# Patient Record
Sex: FEMALE | Race: WHITE
[De-identification: names, ages, dates, MRNs, and addresses within clinical notes are randomized per-mention and may not be internally consistent; named-entity substitution may affect disease eponyms.]

---

## 2018-06-22 ENCOUNTER — HOSPITAL ENCOUNTER (EMERGENCY)
Dept: HOSPITAL 95 - ER | Age: 63
Discharge: HOME | End: 2018-06-22
Payer: COMMERCIAL

## 2018-06-22 VITALS — BODY MASS INDEX: 38.66 KG/M2 | HEIGHT: 69 IN | WEIGHT: 261.01 LBS

## 2018-06-22 DIAGNOSIS — Z79.52: ICD-10-CM

## 2018-06-22 DIAGNOSIS — Z79.899: ICD-10-CM

## 2018-06-22 DIAGNOSIS — Z88.6: ICD-10-CM

## 2018-06-22 DIAGNOSIS — R51: Primary | ICD-10-CM

## 2018-06-22 DIAGNOSIS — E11.9: ICD-10-CM

## 2018-06-22 DIAGNOSIS — Z88.0: ICD-10-CM

## 2018-06-22 DIAGNOSIS — F17.200: ICD-10-CM

## 2018-06-22 DIAGNOSIS — Z79.84: ICD-10-CM

## 2018-06-22 DIAGNOSIS — E78.00: ICD-10-CM

## 2018-07-10 ENCOUNTER — HOSPITAL ENCOUNTER (OUTPATIENT)
Dept: HOSPITAL 95 - LAB SHORT | Age: 63
Discharge: HOME | End: 2018-07-10
Attending: PHYSICIAN ASSISTANT
Payer: COMMERCIAL

## 2018-07-10 DIAGNOSIS — Z51.81: Primary | ICD-10-CM

## 2018-07-10 DIAGNOSIS — Z79.899: ICD-10-CM

## 2018-07-10 PROCEDURE — G0480 DRUG TEST DEF 1-7 CLASSES: HCPCS

## 2018-12-21 ENCOUNTER — HOSPITAL ENCOUNTER (OUTPATIENT)
Dept: HOSPITAL 95 - ORSCSDS | Age: 63
Discharge: HOME | End: 2018-12-21
Attending: INTERNAL MEDICINE
Payer: COMMERCIAL

## 2018-12-21 VITALS — BODY MASS INDEX: 50.73 KG/M2 | HEIGHT: 60 IN | WEIGHT: 258.38 LBS

## 2018-12-21 DIAGNOSIS — R19.4: Primary | ICD-10-CM

## 2018-12-21 DIAGNOSIS — E66.9: ICD-10-CM

## 2018-12-21 DIAGNOSIS — Z80.0: ICD-10-CM

## 2018-12-21 DIAGNOSIS — F17.210: ICD-10-CM

## 2018-12-21 DIAGNOSIS — K64.8: ICD-10-CM

## 2018-12-21 DIAGNOSIS — G47.33: ICD-10-CM

## 2018-12-21 DIAGNOSIS — J44.9: ICD-10-CM

## 2018-12-21 DIAGNOSIS — Z79.84: ICD-10-CM

## 2018-12-21 DIAGNOSIS — E11.9: ICD-10-CM

## 2018-12-21 DIAGNOSIS — D12.0: ICD-10-CM

## 2018-12-21 DIAGNOSIS — E78.5: ICD-10-CM

## 2018-12-21 DIAGNOSIS — E03.9: ICD-10-CM

## 2018-12-21 DIAGNOSIS — Z86.010: ICD-10-CM

## 2018-12-21 DIAGNOSIS — K92.1: ICD-10-CM

## 2018-12-21 DIAGNOSIS — Z79.899: ICD-10-CM

## 2018-12-21 DIAGNOSIS — K31.7: ICD-10-CM

## 2018-12-21 DIAGNOSIS — F41.8: ICD-10-CM

## 2018-12-21 DIAGNOSIS — I10: ICD-10-CM

## 2018-12-21 PROCEDURE — 0DBN8ZX EXCISION OF SIGMOID COLON, VIA NATURAL OR ARTIFICIAL OPENING ENDOSCOPIC, DIAGNOSTIC: ICD-10-PCS | Performed by: INTERNAL MEDICINE

## 2018-12-21 PROCEDURE — 0DBE8ZX EXCISION OF LARGE INTESTINE, VIA NATURAL OR ARTIFICIAL OPENING ENDOSCOPIC, DIAGNOSTIC: ICD-10-PCS | Performed by: INTERNAL MEDICINE

## 2018-12-21 PROCEDURE — 0DB98ZX EXCISION OF DUODENUM, VIA NATURAL OR ARTIFICIAL OPENING ENDOSCOPIC, DIAGNOSTIC: ICD-10-PCS | Performed by: INTERNAL MEDICINE

## 2018-12-21 PROCEDURE — 0DB88ZX EXCISION OF SMALL INTESTINE, VIA NATURAL OR ARTIFICIAL OPENING ENDOSCOPIC, DIAGNOSTIC: ICD-10-PCS | Performed by: INTERNAL MEDICINE

## 2019-09-03 ENCOUNTER — HOSPITAL ENCOUNTER (EMERGENCY)
Dept: HOSPITAL 95 - ER | Age: 64
Discharge: HOME | End: 2019-09-03
Payer: COMMERCIAL

## 2019-09-03 VITALS — BODY MASS INDEX: 45.24 KG/M2 | WEIGHT: 265 LBS | HEIGHT: 64 IN

## 2019-09-03 DIAGNOSIS — Z88.0: ICD-10-CM
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
## 2019-09-03 DIAGNOSIS — Z88.8: ICD-10-CM

## 2019-09-03 DIAGNOSIS — Z79.52: ICD-10-CM

## 2019-09-03 DIAGNOSIS — Z79.899: ICD-10-CM

## 2019-09-03 DIAGNOSIS — F17.210: ICD-10-CM

## 2019-09-03 DIAGNOSIS — E11.9: ICD-10-CM

## 2019-09-03 DIAGNOSIS — Z79.84: ICD-10-CM

## 2019-09-03 DIAGNOSIS — S50.01XA: Primary | ICD-10-CM

## 2020-09-15 ENCOUNTER — HOSPITAL ENCOUNTER (OUTPATIENT)
Dept: HOSPITAL 95 - LAB SHORT | Age: 65
End: 2020-09-15
Attending: NURSE PRACTITIONER
Payer: COMMERCIAL

## 2020-09-15 DIAGNOSIS — Z12.4: Primary | ICD-10-CM

## 2020-09-15 PROCEDURE — G0123 SCREEN CERV/VAG THIN LAYER: HCPCS

## 2020-09-17 LAB — OTHER STN SPEC: (no result)

## 2020-10-28 ENCOUNTER — HOSPITAL ENCOUNTER (OUTPATIENT)
Dept: HOSPITAL 95 - ORSCSDS | Age: 65
Discharge: HOME | End: 2020-10-28
Attending: ANESTHESIOLOGY
Payer: COMMERCIAL

## 2020-10-28 VITALS — WEIGHT: 288.59 LBS | HEIGHT: 64.02 IN | BODY MASS INDEX: 49.27 KG/M2

## 2020-10-28 DIAGNOSIS — G47.33: ICD-10-CM

## 2020-10-28 DIAGNOSIS — F41.9: ICD-10-CM

## 2020-10-28 DIAGNOSIS — Z79.899: ICD-10-CM

## 2020-10-28 DIAGNOSIS — E11.9: ICD-10-CM

## 2020-10-28 DIAGNOSIS — F17.210: ICD-10-CM

## 2020-10-28 DIAGNOSIS — M50.122: Primary | ICD-10-CM

## 2020-10-28 DIAGNOSIS — E66.01: ICD-10-CM

## 2020-10-28 DIAGNOSIS — J44.9: ICD-10-CM

## 2020-10-28 DIAGNOSIS — I10: ICD-10-CM

## 2020-10-28 DIAGNOSIS — Z79.84: ICD-10-CM

## 2020-10-28 DIAGNOSIS — E78.00: ICD-10-CM

## 2020-10-28 PROCEDURE — 3E0R33Z INTRODUCTION OF ANTI-INFLAMMATORY INTO SPINAL CANAL, PERCUTANEOUS APPROACH: ICD-10-PCS | Performed by: ANESTHESIOLOGY

## 2020-10-28 NOTE — NUR
10/28/20 [address]. NOTIFIED THAT PATIENTS BP IS 87/51.  INSTRUCTIONS GIVEN TO
GIVE PATIENT THE REST OF HER IV FLUIDS, WHICH IS ABOUT 100ML.  WILL
CONTINUE TO MONITOR BP.  IF PATIENT FEELS OK, SHE CAN BE DISCHARGED
HOME.

## 2021-01-18 ENCOUNTER — HOSPITAL ENCOUNTER (EMERGENCY)
Dept: HOSPITAL 95 - ER | Age: 66
Discharge: HOME | End: 2021-01-18
Payer: COMMERCIAL

## 2021-01-18 VITALS — WEIGHT: 284.99 LBS | BODY MASS INDEX: 48.65 KG/M2 | HEIGHT: 64 IN

## 2021-01-18 DIAGNOSIS — Z88.6: ICD-10-CM

## 2021-01-18 DIAGNOSIS — F17.210: ICD-10-CM

## 2021-01-18 DIAGNOSIS — E78.00: ICD-10-CM

## 2021-01-18 DIAGNOSIS — Z88.0: ICD-10-CM

## 2021-01-18 DIAGNOSIS — G89.18: Primary | ICD-10-CM

## 2021-01-18 DIAGNOSIS — E11.9: ICD-10-CM

## 2021-01-18 DIAGNOSIS — Z79.899: ICD-10-CM

## 2021-01-18 LAB
ALBUMIN SERPL BCP-MCNC: 2.9 G/DL (ref 3.4–5)
ALBUMIN/GLOB SERPL: 0.6 {RATIO} (ref 0.8–1.8)
ALT SERPL W P-5'-P-CCNC: 15 U/L (ref 12–78)
ANION GAP SERPL CALCULATED.4IONS-SCNC: 8 MMOL/L (ref 6–16)
AST SERPL W P-5'-P-CCNC: 20 U/L (ref 12–37)
BASOPHILS # BLD AUTO: 0.04 K/MM3 (ref 0–0.23)
BASOPHILS NFR BLD AUTO: 1 % (ref 0–2)
BILIRUB SERPL-MCNC: 0.5 MG/DL (ref 0.1–1)
BUN SERPL-MCNC: 7 MG/DL (ref 8–24)
CALCIUM SERPL-MCNC: 8.8 MG/DL (ref 8.5–10.1)
CHLORIDE SERPL-SCNC: 103 MMOL/L (ref 98–108)
CO2 SERPL-SCNC: 24 MMOL/L (ref 21–32)
CREAT SERPL-MCNC: 0.65 MG/DL (ref 0.4–1)
DEPRECATED RDW RBC AUTO: 46.9 FL (ref 35.1–46.3)
EOSINOPHIL # BLD AUTO: 0.18 K/MM3 (ref 0–0.68)
EOSINOPHIL NFR BLD AUTO: 3 % (ref 0–6)
ERYTHROCYTE [DISTWIDTH] IN BLOOD BY AUTOMATED COUNT: 13.6 % (ref 11.7–14.2)
GLOBULIN SER CALC-MCNC: 4.6 G/DL (ref 2.2–4)
GLUCOSE SERPL-MCNC: 129 MG/DL (ref 70–99)
HCT VFR BLD AUTO: 42.3 % (ref 33–51)
HGB BLD-MCNC: 13.6 G/DL (ref 11.5–16)
IMM GRANULOCYTES # BLD AUTO: 0.05 K/MM3 (ref 0–0.1)
IMM GRANULOCYTES NFR BLD AUTO: 1 % (ref 0–1)
LYMPHOCYTES # BLD AUTO: 1.41 K/MM3 (ref 0.84–5.2)
LYMPHOCYTES NFR BLD AUTO: 23 % (ref 21–46)
MCHC RBC AUTO-ENTMCNC: 32.2 G/DL (ref 31.5–36.5)
MCV RBC AUTO: 93 FL (ref 80–100)
MONOCYTES # BLD AUTO: 0.54 K/MM3 (ref 0.16–1.47)
MONOCYTES NFR BLD AUTO: 9 % (ref 4–13)
NEUTROPHILS # BLD AUTO: 3.89 K/MM3 (ref 1.96–9.15)
NEUTROPHILS NFR BLD AUTO: 64 % (ref 41–73)
NRBC # BLD AUTO: 0 K/MM3 (ref 0–0.02)
NRBC BLD AUTO-RTO: 0 /100 WBC (ref 0–0.2)
PLATELET # BLD AUTO: 242 K/MM3 (ref 150–400)
POTASSIUM SERPL-SCNC: 4 MMOL/L (ref 3.5–5.5)
PROT SERPL-MCNC: 7.5 G/DL (ref 6.4–8.2)
SODIUM SERPL-SCNC: 135 MMOL/L (ref 136–145)

## 2022-01-07 ENCOUNTER — HOSPITAL ENCOUNTER (OUTPATIENT)
Dept: HOSPITAL 95 - ORSCSDS | Age: 67
Discharge: HOME | End: 2022-01-07
Attending: INTERNAL MEDICINE
Payer: COMMERCIAL

## 2022-01-07 VITALS — BODY MASS INDEX: 50.02 KG/M2 | HEIGHT: 64 IN | WEIGHT: 293 LBS

## 2022-01-07 DIAGNOSIS — I12.9: ICD-10-CM

## 2022-01-07 DIAGNOSIS — N18.9: ICD-10-CM

## 2022-01-07 DIAGNOSIS — Z86.010: ICD-10-CM

## 2022-01-07 DIAGNOSIS — F41.8: ICD-10-CM

## 2022-01-07 DIAGNOSIS — G47.33: ICD-10-CM

## 2022-01-07 DIAGNOSIS — Z87.891: ICD-10-CM

## 2022-01-07 DIAGNOSIS — E11.9: ICD-10-CM

## 2022-01-07 DIAGNOSIS — Z87.19: ICD-10-CM

## 2022-01-07 DIAGNOSIS — Z12.11: Primary | ICD-10-CM

## 2022-01-07 DIAGNOSIS — D12.3: ICD-10-CM

## 2022-01-07 DIAGNOSIS — K57.30: ICD-10-CM

## 2022-01-07 DIAGNOSIS — Z80.0: ICD-10-CM

## 2022-01-07 DIAGNOSIS — K64.8: ICD-10-CM

## 2022-01-07 DIAGNOSIS — J44.9: ICD-10-CM

## 2022-01-07 DIAGNOSIS — K21.9: ICD-10-CM

## 2022-01-07 DIAGNOSIS — E66.01: ICD-10-CM

## 2022-01-07 PROCEDURE — 0DBE8ZX EXCISION OF LARGE INTESTINE, VIA NATURAL OR ARTIFICIAL OPENING ENDOSCOPIC, DIAGNOSTIC: ICD-10-PCS | Performed by: INTERNAL MEDICINE

## 2022-01-07 PROCEDURE — 0DBL8ZX EXCISION OF TRANSVERSE COLON, VIA NATURAL OR ARTIFICIAL OPENING ENDOSCOPIC, DIAGNOSTIC: ICD-10-PCS | Performed by: INTERNAL MEDICINE

## 2022-05-12 ENCOUNTER — HOSPITAL ENCOUNTER (EMERGENCY)
Dept: HOSPITAL 95 - ER | Age: 67
Discharge: HOME | End: 2022-05-12
Payer: COMMERCIAL

## 2022-05-12 VITALS — BODY MASS INDEX: 50.02 KG/M2 | WEIGHT: 293 LBS | HEIGHT: 64 IN

## 2022-05-12 DIAGNOSIS — Z99.81: ICD-10-CM

## 2022-05-12 DIAGNOSIS — Z87.891: ICD-10-CM

## 2022-05-12 DIAGNOSIS — J44.9: ICD-10-CM

## 2022-05-12 DIAGNOSIS — R07.89: Primary | ICD-10-CM

## 2022-05-12 DIAGNOSIS — E11.9: ICD-10-CM

## 2022-05-12 LAB
ALBUMIN SERPL BCP-MCNC: 3.2 G/DL (ref 3.4–5)
ALBUMIN/GLOB SERPL: 0.8 {RATIO} (ref 0.8–1.8)
ALT SERPL W P-5'-P-CCNC: 46 U/L (ref 12–78)
ANION GAP SERPL CALCULATED.4IONS-SCNC: 3 MMOL/L (ref 6–16)
AST SERPL W P-5'-P-CCNC: 47 U/L (ref 12–37)
BASOPHILS # BLD AUTO: 0.03 K/MM3 (ref 0–0.23)
BASOPHILS NFR BLD AUTO: 1 % (ref 0–2)
BILIRUB SERPL-MCNC: 0.4 MG/DL (ref 0.1–1)
BUN SERPL-MCNC: 15 MG/DL (ref 8–24)
CALCIUM SERPL-MCNC: 9.2 MG/DL (ref 8.5–10.1)
CHLORIDE SERPL-SCNC: 102 MMOL/L (ref 98–108)
CO2 SERPL-SCNC: 33 MMOL/L (ref 21–32)
CREAT SERPL-MCNC: 0.76 MG/DL (ref 0.4–1)
DEPRECATED RDW RBC AUTO: 48 FL (ref 35.1–46.3)
EOSINOPHIL # BLD AUTO: 0.12 K/MM3 (ref 0–0.68)
EOSINOPHIL NFR BLD AUTO: 3 % (ref 0–6)
ERYTHROCYTE [DISTWIDTH] IN BLOOD BY AUTOMATED COUNT: 13.4 % (ref 11.7–14.2)
GLOBULIN SER CALC-MCNC: 3.8 G/DL (ref 2.2–4)
GLUCOSE SERPL-MCNC: 156 MG/DL (ref 70–99)
HCT VFR BLD AUTO: 42.6 % (ref 33–51)
HGB BLD-MCNC: 13.3 G/DL (ref 11.5–16)
IMM GRANULOCYTES # BLD AUTO: 0.02 K/MM3 (ref 0–0.1)
IMM GRANULOCYTES NFR BLD AUTO: 1 % (ref 0–1)
LYMPHOCYTES # BLD AUTO: 0.87 K/MM3 (ref 0.84–5.2)
LYMPHOCYTES NFR BLD AUTO: 20 % (ref 21–46)
MCHC RBC AUTO-ENTMCNC: 31.2 G/DL (ref 31.5–36.5)
MCV RBC AUTO: 96 FL (ref 80–100)
MONOCYTES # BLD AUTO: 0.36 K/MM3 (ref 0.16–1.47)
MONOCYTES NFR BLD AUTO: 8 % (ref 4–13)
NEUTROPHILS # BLD AUTO: 2.9 K/MM3 (ref 1.96–9.15)
NEUTROPHILS NFR BLD AUTO: 67 % (ref 41–73)
NRBC # BLD AUTO: 0 K/MM3 (ref 0–0.02)
NRBC BLD AUTO-RTO: 0 /100 WBC (ref 0–0.2)
PLATELET # BLD AUTO: 193 K/MM3 (ref 150–400)
POTASSIUM SERPL-SCNC: 4.2 MMOL/L (ref 3.5–5.5)
PROT SERPL-MCNC: 7 G/DL (ref 6.4–8.2)
SODIUM SERPL-SCNC: 138 MMOL/L (ref 136–145)

## 2022-07-13 ENCOUNTER — HOSPITAL ENCOUNTER (EMERGENCY)
Dept: HOSPITAL 95 - ER | Age: 67
Discharge: HOME | End: 2022-07-13
Payer: COMMERCIAL

## 2022-07-13 VITALS — HEIGHT: 64 IN | BODY MASS INDEX: 50.02 KG/M2 | WEIGHT: 293 LBS

## 2022-07-13 DIAGNOSIS — Z79.899: ICD-10-CM

## 2022-07-13 DIAGNOSIS — Z88.6: ICD-10-CM

## 2022-07-13 DIAGNOSIS — Z79.84: ICD-10-CM

## 2022-07-13 DIAGNOSIS — B37.0: ICD-10-CM

## 2022-07-13 DIAGNOSIS — Z88.0: ICD-10-CM

## 2022-07-13 DIAGNOSIS — F17.210: ICD-10-CM

## 2022-07-13 DIAGNOSIS — J44.1: Primary | ICD-10-CM

## 2022-07-13 DIAGNOSIS — E11.9: ICD-10-CM

## 2022-10-03 ENCOUNTER — HOSPITAL ENCOUNTER (OUTPATIENT)
Dept: HOSPITAL 95 - LAB | Age: 67
Discharge: HOME | End: 2022-10-03
Payer: COMMERCIAL

## 2022-10-03 DIAGNOSIS — R19.5: Primary | ICD-10-CM

## 2022-12-11 ENCOUNTER — HOSPITAL ENCOUNTER (EMERGENCY)
Dept: HOSPITAL 95 - ER | Age: 67
Discharge: HOME | End: 2022-12-11
Payer: COMMERCIAL

## 2022-12-11 VITALS — HEIGHT: 64 IN | WEIGHT: 287.99 LBS | BODY MASS INDEX: 49.17 KG/M2

## 2022-12-11 DIAGNOSIS — F17.210: ICD-10-CM

## 2022-12-11 DIAGNOSIS — L02.214: Primary | ICD-10-CM

## 2022-12-11 DIAGNOSIS — Z88.0: ICD-10-CM

## 2022-12-11 DIAGNOSIS — E11.9: ICD-10-CM

## 2022-12-11 DIAGNOSIS — Z79.899: ICD-10-CM

## 2022-12-11 PROCEDURE — A9270 NON-COVERED ITEM OR SERVICE: HCPCS

## 2022-12-14 ENCOUNTER — HOSPITAL ENCOUNTER (OUTPATIENT)
Dept: HOSPITAL 95 - MHTC | Age: 67
Discharge: HOME | End: 2022-12-14
Attending: NURSE PRACTITIONER
Payer: COMMERCIAL

## 2022-12-14 VITALS — WEIGHT: 284.4 LBS | HEIGHT: 64 IN | BODY MASS INDEX: 48.55 KG/M2

## 2022-12-14 DIAGNOSIS — R55: ICD-10-CM

## 2022-12-14 DIAGNOSIS — E78.5: ICD-10-CM

## 2022-12-14 DIAGNOSIS — Z87.891: ICD-10-CM

## 2022-12-14 DIAGNOSIS — E03.9: ICD-10-CM

## 2022-12-14 DIAGNOSIS — I25.10: Primary | ICD-10-CM

## 2022-12-14 DIAGNOSIS — J44.9: ICD-10-CM

## 2022-12-14 DIAGNOSIS — Z88.0: ICD-10-CM

## 2022-12-14 DIAGNOSIS — R07.89: ICD-10-CM

## 2022-12-14 DIAGNOSIS — I48.0: ICD-10-CM

## 2022-12-14 DIAGNOSIS — I47.1: ICD-10-CM

## 2022-12-14 DIAGNOSIS — I47.20: ICD-10-CM

## 2022-12-14 PROCEDURE — C1887 CATHETER, GUIDING: HCPCS

## 2022-12-14 PROCEDURE — C1769 GUIDE WIRE: HCPCS

## 2022-12-14 PROCEDURE — C1894 INTRO/SHEATH, NON-LASER: HCPCS

## 2022-12-14 NOTE — NUR
PIV REMOVED FROM THE LEFT AC. CATH TIP INTACT. PRESSURE DRESSING APPLIED.
REVIEWED DISCHARGE INSTRUCTIONS WITH THE PATIENT AND FAMILY. COPIES GIVEN AND
PATIENT ASSISTED UP TO THE BEDSIDE COMMODE. PATIENT ALSO DRESSED WITH
ASSISTANCE WITH FAMILY.

## 2022-12-14 NOTE — NUR
FAMILY BROUGHT TO THE BEDSIDE, CALL LIGHT IN REACH. AMSUME CARE OF PATIENT,
SBAR RECEIVED FROM DREW MALDONADO.

## 2022-12-14 NOTE — NUR
PT RETURNED TO RECOVERY ROOM IN BED. RIGHT RADIAL TR BAND SITE SOFT NON-TENDER
WITH NO HEMATOMA, NO PULSATILE BLEEDING AND WRIST BOARD IN PLACE. PT DENIES
CHEST PAIN. CALL LIGHT IN REACH.

## 2022-12-14 NOTE — NUR
TR BAND REMOVED AND SITE CLEANSED, CLOTH DOT PLACED. ARM BOARD TO RIGHT ARM.
PT TO PRIVATE VEHICLE PER W/C.

## 2023-04-18 ENCOUNTER — HOSPITAL ENCOUNTER (OUTPATIENT)
Dept: HOSPITAL 95 - LAB SHORT | Age: 68
Discharge: HOME | End: 2023-04-18
Attending: PHYSICIAN ASSISTANT
Payer: COMMERCIAL

## 2023-04-18 DIAGNOSIS — E11.42: Primary | ICD-10-CM
